# Patient Record
(demographics unavailable — no encounter records)

---
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
## 2024-11-05 NOTE — ASSESSMENT
[FreeTextEntry1] : # Thrombocytosis # Normocytic anemia - No prior labs for comparison - Iron studies: iron 13, sat 11%, TIBC 117, no ferritin - normal B12 and folate - Patient with worsening thrombocytosis, reports occasional dizziness and tingling in UE, no previous labs available for comparison. Although could be reactive in setting of active infection, platelets count is very high so would recommend ruling out MPD. - JAK2, CALR, MPL, BCR-ABL normal from 06/2024  - US Abd performed to evaluate for splenomegaly was poorly visualized   In summary, thrombocytosis appears to be reactive due to infection: there is a dramatic improvement of counts following the resolution of infection; f/u PRN